# Patient Record
Sex: MALE | Race: WHITE | Employment: UNEMPLOYED | URBAN - METROPOLITAN AREA
[De-identification: names, ages, dates, MRNs, and addresses within clinical notes are randomized per-mention and may not be internally consistent; named-entity substitution may affect disease eponyms.]

---

## 2017-02-05 ENCOUNTER — APPOINTMENT (OUTPATIENT)
Dept: GENERAL RADIOLOGY | Facility: CLINIC | Age: 37
End: 2017-02-05
Attending: EMERGENCY MEDICINE

## 2017-02-05 ENCOUNTER — HOSPITAL ENCOUNTER (EMERGENCY)
Facility: CLINIC | Age: 37
Discharge: HOME | End: 2017-02-05
Attending: EMERGENCY MEDICINE | Admitting: EMERGENCY MEDICINE

## 2017-02-05 VITALS
RESPIRATION RATE: 12 BRPM | DIASTOLIC BLOOD PRESSURE: 72 MMHG | SYSTOLIC BLOOD PRESSURE: 120 MMHG | OXYGEN SATURATION: 95 % | HEART RATE: 134 BPM | HEIGHT: 72 IN | TEMPERATURE: 98.2 F

## 2017-02-05 DIAGNOSIS — R07.89 ATYPICAL CHEST PAIN: ICD-10-CM

## 2017-02-05 LAB
ANION GAP SERPL CALCULATED.3IONS-SCNC: 10 MMOL/L (ref 3–14)
BASOPHILS # BLD AUTO: 0 10E9/L (ref 0–0.2)
BASOPHILS NFR BLD AUTO: 0.3 %
BUN SERPL-MCNC: 12 MG/DL (ref 7–30)
CALCIUM SERPL-MCNC: 8.1 MG/DL (ref 8.5–10.1)
CHLORIDE SERPL-SCNC: 104 MMOL/L (ref 94–109)
CO2 SERPL-SCNC: 27 MMOL/L (ref 20–32)
CREAT SERPL-MCNC: 0.9 MG/DL (ref 0.66–1.25)
D DIMER PPP FEU-MCNC: 0.3 UG/ML FEU (ref 0–0.5)
DIFFERENTIAL METHOD BLD: ABNORMAL
EOSINOPHIL # BLD AUTO: 0.2 10E9/L (ref 0–0.7)
EOSINOPHIL NFR BLD AUTO: 1.6 %
ERYTHROCYTE [DISTWIDTH] IN BLOOD BY AUTOMATED COUNT: 13.4 % (ref 10–15)
GFR SERPL CREATININE-BSD FRML MDRD: ABNORMAL ML/MIN/1.7M2
GLUCOSE SERPL-MCNC: 89 MG/DL (ref 70–99)
HCT VFR BLD AUTO: 44.5 % (ref 40–53)
HGB BLD-MCNC: 15.2 G/DL (ref 13.3–17.7)
IMM GRANULOCYTES # BLD: 0 10E9/L (ref 0–0.4)
IMM GRANULOCYTES NFR BLD: 0.3 %
LIPASE SERPL-CCNC: 206 U/L (ref 73–393)
LYMPHOCYTES # BLD AUTO: 2.4 10E9/L (ref 0.8–5.3)
LYMPHOCYTES NFR BLD AUTO: 17.8 %
MCH RBC QN AUTO: 30.5 PG (ref 26.5–33)
MCHC RBC AUTO-ENTMCNC: 34.2 G/DL (ref 31.5–36.5)
MCV RBC AUTO: 89 FL (ref 78–100)
MONOCYTES # BLD AUTO: 0.7 10E9/L (ref 0–1.3)
MONOCYTES NFR BLD AUTO: 5.2 %
NEUTROPHILS # BLD AUTO: 10.3 10E9/L (ref 1.6–8.3)
NEUTROPHILS NFR BLD AUTO: 74.8 %
NRBC # BLD AUTO: 0 10*3/UL
NRBC BLD AUTO-RTO: 0 /100
PLATELET # BLD AUTO: 324 10E9/L (ref 150–450)
PLATELET # BLD EST: ABNORMAL 10*3/UL
POTASSIUM SERPL-SCNC: 3.6 MMOL/L (ref 3.4–5.3)
RBC # BLD AUTO: 4.99 10E12/L (ref 4.4–5.9)
SODIUM SERPL-SCNC: 142 MMOL/L (ref 133–144)
TROPONIN I SERPL-MCNC: NORMAL UG/L (ref 0–0.04)
WBC # BLD AUTO: 13.7 10E9/L (ref 4–11)

## 2017-02-05 PROCEDURE — 25000128 H RX IP 250 OP 636: Performed by: EMERGENCY MEDICINE

## 2017-02-05 PROCEDURE — 71020 XR CHEST 2 VW: CPT

## 2017-02-05 PROCEDURE — 83690 ASSAY OF LIPASE: CPT | Performed by: EMERGENCY MEDICINE

## 2017-02-05 PROCEDURE — 93005 ELECTROCARDIOGRAM TRACING: CPT | Performed by: EMERGENCY MEDICINE

## 2017-02-05 PROCEDURE — 80048 BASIC METABOLIC PNL TOTAL CA: CPT | Performed by: EMERGENCY MEDICINE

## 2017-02-05 PROCEDURE — 96360 HYDRATION IV INFUSION INIT: CPT | Performed by: EMERGENCY MEDICINE

## 2017-02-05 PROCEDURE — 85379 FIBRIN DEGRADATION QUANT: CPT | Performed by: EMERGENCY MEDICINE

## 2017-02-05 PROCEDURE — 99285 EMERGENCY DEPT VISIT HI MDM: CPT | Mod: 25 | Performed by: EMERGENCY MEDICINE

## 2017-02-05 PROCEDURE — 84484 ASSAY OF TROPONIN QUANT: CPT | Performed by: EMERGENCY MEDICINE

## 2017-02-05 PROCEDURE — 93010 ELECTROCARDIOGRAM REPORT: CPT | Mod: Z6 | Performed by: EMERGENCY MEDICINE

## 2017-02-05 PROCEDURE — 99284 EMERGENCY DEPT VISIT MOD MDM: CPT | Mod: 25 | Performed by: EMERGENCY MEDICINE

## 2017-02-05 PROCEDURE — 85025 COMPLETE CBC W/AUTO DIFF WBC: CPT | Performed by: EMERGENCY MEDICINE

## 2017-02-05 RX ORDER — SODIUM CHLORIDE 9 MG/ML
1000 INJECTION, SOLUTION INTRAVENOUS CONTINUOUS
Status: DISCONTINUED | OUTPATIENT
Start: 2017-02-05 | End: 2017-02-06 | Stop reason: HOSPADM

## 2017-02-05 RX ADMIN — SODIUM CHLORIDE 1000 ML: 9 INJECTION, SOLUTION INTRAVENOUS at 21:40

## 2017-02-05 ASSESSMENT — ENCOUNTER SYMPTOMS
COUGH: 0
NERVOUS/ANXIOUS: 1
ABDOMINAL PAIN: 0
FEVER: 0
SHORTNESS OF BREATH: 0

## 2017-02-05 NOTE — ED AVS SNAPSHOT
[facility], Emergency Department    [address] [facility] [address]    Phone:  [phone]                                       Bryce Rivera   MRN: [id number]    Department:  [facility], Emergency Department   Date of Visit:  2/5/2017           Patient Information     Date Of Birth          1980        Your diagnoses for this visit were:     Atypical chest pain        You were seen by Michael Mariano MD.        Discharge Instructions       Call your primary care doctor in 1 day to let them know you were seen in the ED.  See them soon in the office to follow up with this problem.      Please make an appointment to follow [facility] with [facility] (phone: [phone] and [facility] (phone: [phone]) if you need a primary care doctor and don't have one    Take ibuprofen (motrin) every 6 hours as needed for pain.     You can also take acetaminophen (tylenol) every 6 hours as needed for pain.     It is safe to take acetaminophen and ibuprofen together to get better pain relief.      See your psychiatrist at your next visit and mention these symptoms.    Return to the ED if you experience worsening chest pain, problems breathing, or other concerning symptoms.            Discharge References/Attachments     CHEST PAIN, UNCERTAIN CAUSE (ENGLISH)      24 Hour Appointment Hotline       To make an appointment at any [facility], call [phone] ([phone]). If you don't have a family doctor or clinic, we will help you find one. [facility] are conveniently located to serve the needs of you and your family.             Review of your medicines      Our records show that you are taking the medicines listed below. If these are incorrect, please call your family doctor or clinic.        Dose / Directions Last dose taken    ARIPiprazole 20 MG tablet   Commonly known as:  ABILIFY   Dose:  20 mg   Quantity:  30 tablet        Take 1 tablet (20 mg) by  "mouth every evening   Refills:  0        divalproex 500 MG 24 hr tablet   Commonly known as:  DEPAKOTE ER   Dose:  1500 mg   Quantity:  90 tablet        Take 3 tablets (1,500 mg) by mouth every evening   Refills:  0        GABAPENTIN PO   Dose:  200 mg        Take 200 mg by mouth 3 times daily   Refills:  0                Procedures and tests performed during your visit     Procedure/Test Number of Times Performed    Basic metabolic panel 1    CBC with platelets differential 1    D dimer quantitative 1    EKG 12 lead 2    Lipase 1    Troponin I 1    XR Chest 2 Views 1      Orders Needing Specimen Collection     None      Pending Results     Date and Time Order Name Status Description    2017 EKG 12 lead Preliminary             Pending Culture Results     No orders found from 2017 to 2017.            Thank you for choosing [facility]       Thank you for choosing [facility] for your care. Our goal is always to provide you with excellent care. Hearing back from our patients is one way we can continue to improve our services. Please take a few minutes to complete the written survey that you may receive in the mail after you visit with us. Thank you!        My [employer] Information     [employer] lets you send messages to your doctor, view your test results, renew your prescriptions, schedule appointments and more. To sign up, go to [email or web address] . Click on \"Log in\" on the left side of the screen, which will take you to the Welcome page. Then click on \"Sign up Now\" on the right side of the page.     You will be asked to enter the access code listed below, as well as some personal information. Please follow the directions to create your username and password.     Your access code is: [email or web address]  Expires: 2017 10:53 PM     Your access code will  in 90 days. If you need help or a new code, please call your [facility] clinic or [phone].        Care EveryWhere ID     This is your Care EveryWhere " ID. This could be used by other organizations to access your [facility] medical records  [id number]        After Visit Summary       This is your record. Keep this with you and show to your community pharmacist(s) and doctor(s) at your next visit.

## 2017-02-05 NOTE — ED AVS SNAPSHOT
[facility], [place], Emergency Department    [address] [place] [address]    Phone:  [phone]                                       Bryce Rivera   MRN: [id number]    Department:  [facility], Emergency Department   Date of Visit:  2/5/2017           After Visit Summary Signature Page     I have received my discharge instructions, and my questions have been answered. I have discussed any challenges I see with this plan with the nurse or doctor.    ..........................................................................................................................................  Patient/Patient Representative Signature      ..........................................................................................................................................  Patient Representative Print Name and Relationship to Patient    ..................................................               ................................................  Date                                            Time    ..........................................................................................................................................  Reviewed by Signature/Title    ...................................................              ..............................................  Date                                                            Time

## 2017-02-06 LAB — INTERPRETATION ECG - MUSE: NORMAL

## 2017-02-06 NOTE — ED PROVIDER NOTES
[place] EMERGENCY DEPARTMENT ([facility])  February 5, 2017  ED 9   History     Chief Complaint   Patient presents with     Chest Pain     The history is provided by the patient and medical records.     Bryce Rivera is a 36 year old male with history of schizoaffective psychosis, currently on Abilify monthly injections who presents to the Emergency Department with chest pain. Patient states he recently tried to be seen at the [facility] Emergency Department a few days ago for this but he wasn t fully evaluated. he says he has had the pain for the past 2 weeks. The pain is intermittent, seems to be in the center of his chest, can last a few seconds to minutes. It is associated with feelings of anxiety and breathlessness. He says he still has ok exercise tolerance otherwise. He was able to go to a motorcycle convention today and walk around fine all day without having severe symptoms but decided to come in tonight because things were getting worse. Patient has no history of DVT or PE, no history of lung problems or heart disease in the past. Patient gives a somewhat limited history. No fevers, no chills, no cough. He is a smoker but denies any recreational drug use, denies any heavy alcohol use.       After speaking with patient and looking at [facility] records he was admitted to [facility] for about a month because he was running through the snow, had frostbite to his bilateral feet and toes, was seen by wound care and psychiatry daily. He was stabilized on Abilify monthly injections and discharged. He states his last Abilify injection was on 2/1/17 and he is due for another injection on 3/1/17. Patient hasn t noticed any drainage or redness or rash anywhere. He has not had any belly pain. He has had no nausea or vomiting.  He currently lives with his mother.  At present he has some anxious feelings but denies CP or dyspnea.      I have reviewed the Medications, Allergies, Past Medical and Surgical History,  and Social History in the Epic system.    Review of Systems   Constitutional: Negative for fever.   Respiratory: Negative for cough and shortness of breath.    Cardiovascular: Positive for chest pain.   Gastrointestinal: Negative for abdominal pain.   Psychiatric/Behavioral: The patient is nervous/anxious.    All other systems reviewed and are negative.      Physical Exam   BP: 133/87 mmHg  Pulse: 134  Temp: 98.2  F (36.8  C)  Resp: 20  Height: 182.9 cm (6')  SpO2: 95 %  Physical Exam   Constitutional: He is oriented to person, place, and time. He appears well-developed and well-nourished. No distress.   HENT:   Head: Normocephalic and atraumatic.   Mouth/Throat: Oropharynx is clear and moist. No oropharyngeal exudate.   Eyes: Conjunctivae and EOM are normal. Pupils are equal, round, and reactive to light.   Neck: Normal range of motion. Neck supple.   Cardiovascular: Normal rate, regular rhythm, normal heart sounds and intact distal pulses.    No murmur heard.  Pulmonary/Chest: Effort normal and breath sounds normal. No respiratory distress. He has no wheezes. He has no rales.   Tachycardic to 113   Abdominal: Soft. Bowel sounds are normal. He exhibits no distension. There is no tenderness. There is no rebound and no guarding.   Musculoskeletal: Normal range of motion. He exhibits no edema or tenderness.   Neurological: He is alert and oriented to person, place, and time. He exhibits normal muscle tone.   No pronator drift, normal strength/sensation all extremities  No tongue fasciculations  No tremor   Skin: Skin is warm and dry. He is not diaphoretic.   + red erythema to soles and toes of feet with some blistering.  Normal sensation across toes, normal CR.  No wounds.  Don't appear like cellulitis.     Psychiatric: He has a normal mood and affect. His behavior is normal. Judgment and thought content normal.   Flattened affect   Nursing note and vitals reviewed.      ED Course     Procedures             EKG  Interpretation:      Interpreted by Michael Mariano  Time reviewed:   Symptoms at time of EK   Rhythm: sinus tachycardia  Rate: normal  Axis: normal  Ectopy: none  Conduction: normal  ST Segments/ T Waves: No ST-T wave changes  Q Waves: none  Comparison to prior: No old EKG available    Clinical Impression: normal EKG       chest X-ray: personal reviewed by me no pneumothorax, normal mediastinum, no infiltrate, no edema.  Critical Care time:             Labs Ordered and Resulted from Time of ED Arrival Up to the Time of Departure from the ED   BASIC METABOLIC PANEL - Abnormal; Notable for the following:     Calcium 8.1 (*)     All other components within normal limits   CBC WITH PLATELETS DIFFERENTIAL - Abnormal; Notable for the following:     WBC 13.7 (*)     Absolute Neutrophil 10.3 (*)     All other components within normal limits   LIPASE   TROPONIN I   D DIMER QUANTITATIVE       Assessments & Plan (with Medical Decision Making)     Bryce Rivera is a 36 year old male with history of schizoaffective psychosis presents to the ED with intermittent chest discomfort, breathlessness, and anxious feelings.  Symptoms only last seconds to minutes, he has symptom-free intervals in between.  Lower suspicion ACS or PE.  No ischemic EKG changes, trop negative.  No previous hx CAD.  I think he is low risk for PE, a D-dimer was also negative.  He has a mild nonspecific leukocytosis.  Pt appears very well in the ED.  No PTX or PNA seen either.  No clear source of infection from his history.  His feet appear to be well taken care of, no open wounds or signs of infection.  They appear like slowly healing frostbite changes.  He continues to be tachycardic despite 1 liter of fluid.  Pt states he feels better and wants to return home.  He was given contact information for a  Primary care physician.  He will also follow up with his psychiatrist.      I have reviewed the nursing notes.    I have reviewed the  findings, diagnosis, plan and need for follow up with the patient.    Discharge Medication List as of 2/5/2017 11:11 PM          Final diagnoses:   Atypical chest pain   I, Ibeth Ramos, am serving as a trained medical scribe to document services personally performed by Michael Mariano MD, based on the provider's statements to me on February 5, 2017.  This document has been checked and approved by the attending provider.    IMichael MD, was physically present and have reviewed and verified the accuracy of this note documented by Ibeth Ramos, medical scribe.       2/5/2017   [facility], [place], EMERGENCY DEPARTMENT      Michael Mariano MD  02/06/17 0034

## 2017-02-06 NOTE — ED NOTES
"Seen at [facility] a few days ago with chest pain, states no ekg performed, discharged \"they refused to do anything for me\"  Describes as sharp and random, right-upper chest  SOB  Admits to drinking 2 beers today, denies other drug use  "

## 2017-02-06 NOTE — DISCHARGE INSTRUCTIONS
Call your primary care doctor in 1 day to let them know you were seen in the ED.  See them soon in the office to follow up with this problem.      Please make an appointment to follow up with [facility] (phone: [phone] and [facility] (phone: [phone]) if you need a primary care doctor and don't have one    Take ibuprofen (motrin) every 6 hours as needed for pain.     You can also take acetaminophen (tylenol) every 6 hours as needed for pain.     It is safe to take acetaminophen and ibuprofen together to get better pain relief.      See your psychiatrist at your next visit and mention these symptoms.    Return to the ED if you experience worsening chest pain, problems breathing, or other concerning symptoms.

## 2018-06-09 ENCOUNTER — RECORDS - HEALTHEAST (OUTPATIENT)
Dept: LAB | Facility: CLINIC | Age: 38
End: 2018-06-09

## 2018-06-11 LAB
ALBUMIN SERPL-MCNC: 3.8 G/DL (ref 3.5–5)
ALP SERPL-CCNC: 112 U/L (ref 45–120)
ALT SERPL W P-5'-P-CCNC: 70 U/L (ref 0–45)
AST SERPL W P-5'-P-CCNC: 35 U/L (ref 0–40)
BILIRUB DIRECT SERPL-MCNC: 0.4 MG/DL
BILIRUB SERPL-MCNC: 0.8 MG/DL (ref 0–1)
ERYTHROCYTE [DISTWIDTH] IN BLOOD BY AUTOMATED COUNT: 13.2 % (ref 11–14.5)
HCT VFR BLD AUTO: 49.9 % (ref 40–54)
HGB BLD-MCNC: 16.7 G/DL (ref 14–18)
MCH RBC QN AUTO: 30.1 PG (ref 27–34)
MCHC RBC AUTO-ENTMCNC: 33.5 G/DL (ref 32–36)
MCV RBC AUTO: 90 FL (ref 80–100)
PLATELET # BLD AUTO: 293 THOU/UL (ref 140–440)
PMV BLD AUTO: 9.6 FL (ref 8.5–12.5)
PROT SERPL-MCNC: 7.4 G/DL (ref 6–8)
RBC # BLD AUTO: 5.55 MILL/UL (ref 4.4–6.2)
WBC: 7 THOU/UL (ref 4–11)

## 2018-06-12 LAB — 25(OH)D3 SERPL-MCNC: 40.3 NG/ML (ref 30–80)
